# Patient Record
Sex: FEMALE | Race: OTHER | HISPANIC OR LATINO | URBAN - METROPOLITAN AREA
[De-identification: names, ages, dates, MRNs, and addresses within clinical notes are randomized per-mention and may not be internally consistent; named-entity substitution may affect disease eponyms.]

---

## 2020-06-16 ENCOUNTER — OFFICE (OUTPATIENT)
Dept: URBAN - METROPOLITAN AREA CLINIC 11 | Facility: CLINIC | Age: 50
End: 2020-06-16

## 2020-06-16 VITALS
SYSTOLIC BLOOD PRESSURE: 129 MMHG | HEART RATE: 73 BPM | HEIGHT: 66 IN | WEIGHT: 228 LBS | DIASTOLIC BLOOD PRESSURE: 85 MMHG

## 2020-06-16 DIAGNOSIS — F32.9 MAJOR DEPRESSIVE DISORDER, SINGLE EPISODE, UNSPECIFIED: ICD-10-CM

## 2020-06-16 DIAGNOSIS — R19.7 DIARRHEA, UNSPECIFIED: ICD-10-CM

## 2020-06-16 DIAGNOSIS — K59.00 CONSTIPATION, UNSPECIFIED: ICD-10-CM

## 2020-06-16 DIAGNOSIS — Z83.71 FAMILY HISTORY OF COLONIC POLYPS: ICD-10-CM

## 2020-06-16 PROCEDURE — 99204 OFFICE O/P NEW MOD 45 MIN: CPT | Performed by: INTERNAL MEDICINE

## 2020-06-16 RX ORDER — POLYETHYLENE GLYCOL 3350, SODIUM SULFATE, SODIUM CHLORIDE, POTASSIUM CHLORIDE, ASCORBIC ACID, SODIUM ASCORBATE 140-9-5.2G
KIT ORAL
Qty: 1 | Refills: 0 | Status: COMPLETED
Start: 2020-06-16 | End: 2020-06-22

## 2020-06-16 NOTE — SERVICEHPINOTES
"I have had diarrhea for about 8 years."  She stated that over the past 8 years or more she has had difficulties with her bowel patterns.  She will have a skip of 1-2 days without a stool and then will pass a small firm stool which is followed by about 3 more progressively softer stools that day.  She stated that then she  may have diarrhea for about 2-3 days with about 6-8 stools during that time with mucus and runny stools.  She has associated some of this to a chronic pain in her lower back (she actually called it sacral pain).  This pain has been a more constant issue and is an "11" at times.  She has not had bleeding or blood in her stools.  She has had some issues with gas and bloating.  She has related the changes in her stools to her pain issues (she is not on narcotics).  She has not noted a particular dietary issue with her stooling pattern.  When asked, she has had some relationship to her GI symptoms with her depression and anxiety issues.   She has not had a colonoscopy.  Her father had colon cancer at the age of 64 and had polyps prior to that.  Her grandmother had colon cancer as well.She was to start on Cymbalta but has not done so.  She is not currently seeing a counselor or psychiatrist for her depression and anxiety issues.  She did not think it helped and did not see the point in taking them. She does have a hx of etoh use and has been ETOH free for over a year. She stated that she has saved 11,000 dollars since then and has lost 30lbs.

## 2020-06-16 NOTE — SERVICENOTES
We dsicussed her hx in detail and reviewed a dif dx (including IBS, IBD, microscopic colitis, associated rheumatologic issues, depression/anxiety, bacterial overgrowth etc...).  She is due for her colonoscopy, noting her age and family hx, and will need random colon bx at the time.  We discussed the r/b/a to colonoscopy and she agreed to proceed.  We dicsussed the need for tx of her anxiety/depression, consistency with her meds, and that I would rec a counselor.  She is going to f/u with her PCP for this.  I would like copies of the labs and reports from Dr. Almeida as well noting the potential overlap of GI and rheumatologic disorders (including SI joint disease).

## 2020-06-22 ENCOUNTER — AMBULATORY SURGICAL CENTER (OUTPATIENT)
Dept: URBAN - METROPOLITAN AREA SURGERY 3 | Facility: SURGERY | Age: 50
End: 2020-06-22
Payer: OTHER GOVERNMENT

## 2020-06-22 ENCOUNTER — OFFICE (OUTPATIENT)
Dept: URBAN - METROPOLITAN AREA PATHOLOGY 22 | Facility: PATHOLOGY | Age: 50
End: 2020-06-22
Payer: OTHER GOVERNMENT

## 2020-06-22 VITALS
SYSTOLIC BLOOD PRESSURE: 148 MMHG | HEART RATE: 89 BPM | HEART RATE: 80 BPM | DIASTOLIC BLOOD PRESSURE: 76 MMHG | DIASTOLIC BLOOD PRESSURE: 81 MMHG | HEIGHT: 66 IN | OXYGEN SATURATION: 98 % | OXYGEN SATURATION: 97 % | DIASTOLIC BLOOD PRESSURE: 97 MMHG | OXYGEN SATURATION: 94 % | DIASTOLIC BLOOD PRESSURE: 76 MMHG | TEMPERATURE: 98 F | SYSTOLIC BLOOD PRESSURE: 142 MMHG | SYSTOLIC BLOOD PRESSURE: 122 MMHG | SYSTOLIC BLOOD PRESSURE: 110 MMHG | SYSTOLIC BLOOD PRESSURE: 110 MMHG | HEIGHT: 66 IN | RESPIRATION RATE: 20 BRPM | SYSTOLIC BLOOD PRESSURE: 133 MMHG | HEART RATE: 98 BPM | DIASTOLIC BLOOD PRESSURE: 94 MMHG | SYSTOLIC BLOOD PRESSURE: 122 MMHG | OXYGEN SATURATION: 94 % | SYSTOLIC BLOOD PRESSURE: 133 MMHG | RESPIRATION RATE: 16 BRPM | DIASTOLIC BLOOD PRESSURE: 94 MMHG | RESPIRATION RATE: 16 BRPM | RESPIRATION RATE: 18 BRPM | OXYGEN SATURATION: 98 % | TEMPERATURE: 97.6 F | DIASTOLIC BLOOD PRESSURE: 97 MMHG | RESPIRATION RATE: 16 BRPM | DIASTOLIC BLOOD PRESSURE: 81 MMHG | SYSTOLIC BLOOD PRESSURE: 110 MMHG | OXYGEN SATURATION: 98 % | DIASTOLIC BLOOD PRESSURE: 94 MMHG | WEIGHT: 228 LBS | SYSTOLIC BLOOD PRESSURE: 122 MMHG | TEMPERATURE: 98 F | DIASTOLIC BLOOD PRESSURE: 79 MMHG | WEIGHT: 228 LBS | HEART RATE: 76 BPM | OXYGEN SATURATION: 97 % | HEIGHT: 66 IN | RESPIRATION RATE: 18 BRPM | RESPIRATION RATE: 20 BRPM | SYSTOLIC BLOOD PRESSURE: 148 MMHG | DIASTOLIC BLOOD PRESSURE: 79 MMHG | DIASTOLIC BLOOD PRESSURE: 97 MMHG | DIASTOLIC BLOOD PRESSURE: 81 MMHG | SYSTOLIC BLOOD PRESSURE: 133 MMHG | HEART RATE: 98 BPM | OXYGEN SATURATION: 97 % | TEMPERATURE: 97.6 F | HEART RATE: 76 BPM | TEMPERATURE: 98 F | RESPIRATION RATE: 18 BRPM | OXYGEN SATURATION: 94 % | TEMPERATURE: 97.6 F | HEART RATE: 80 BPM | HEART RATE: 75 BPM | HEART RATE: 89 BPM | DIASTOLIC BLOOD PRESSURE: 76 MMHG | HEART RATE: 75 BPM | SYSTOLIC BLOOD PRESSURE: 142 MMHG | WEIGHT: 228 LBS | HEART RATE: 76 BPM | HEART RATE: 98 BPM | DIASTOLIC BLOOD PRESSURE: 79 MMHG | HEART RATE: 80 BPM | SYSTOLIC BLOOD PRESSURE: 142 MMHG | RESPIRATION RATE: 20 BRPM | SYSTOLIC BLOOD PRESSURE: 148 MMHG | HEART RATE: 75 BPM | HEART RATE: 89 BPM

## 2020-06-22 DIAGNOSIS — D12.4 BENIGN NEOPLASM OF DESCENDING COLON: ICD-10-CM

## 2020-06-22 DIAGNOSIS — K63.5 POLYP OF COLON: ICD-10-CM

## 2020-06-22 DIAGNOSIS — R19.7 DIARRHEA, UNSPECIFIED: ICD-10-CM

## 2020-06-22 PROBLEM — K52.89 CLINICALLY SIGNIFICANT DIARRHEA OF UNEXPLAINED ORIGIN: Status: ACTIVE | Noted: 2020-06-22

## 2020-06-22 PROBLEM — Z12.11 SCREENING FOR COLONIC NEOPLASIA: Status: ACTIVE | Noted: 2020-06-22

## 2020-06-22 PROCEDURE — 45385 COLONOSCOPY W/LESION REMOVAL: CPT | Performed by: INTERNAL MEDICINE

## 2020-06-22 PROCEDURE — 88305 TISSUE EXAM BY PATHOLOGIST: CPT | Performed by: INTERNAL MEDICINE

## 2020-06-22 PROCEDURE — 45380 COLONOSCOPY AND BIOPSY: CPT | Mod: 59 | Performed by: INTERNAL MEDICINE

## 2020-06-22 PROCEDURE — 88342 IMHCHEM/IMCYTCHM 1ST ANTB: CPT | Performed by: INTERNAL MEDICINE
